# Patient Record
Sex: MALE | Race: WHITE
[De-identification: names, ages, dates, MRNs, and addresses within clinical notes are randomized per-mention and may not be internally consistent; named-entity substitution may affect disease eponyms.]

---

## 2019-01-29 ENCOUNTER — HOSPITAL ENCOUNTER (OUTPATIENT)
Dept: HOSPITAL 84 - D.LAB | Age: 74
Discharge: HOME | End: 2019-01-29
Attending: INTERNAL MEDICINE
Payer: MEDICARE

## 2019-01-29 VITALS — BODY MASS INDEX: 24.4 KG/M2

## 2019-01-29 DIAGNOSIS — K74.69: Primary | ICD-10-CM

## 2019-01-29 LAB
ALBUMIN SERPL-MCNC: 2.8 G/DL (ref 3.4–5)
ALP SERPL-CCNC: 61 U/L (ref 46–116)
ALT SERPL-CCNC: 65 U/L (ref 10–68)
ANION GAP SERPL CALC-SCNC: 10.5 MMOL/L (ref 8–16)
BASOPHILS NFR BLD AUTO: 0.2 % (ref 0–2)
BILIRUB SERPL-MCNC: 0.44 MG/DL (ref 0.2–1.3)
BUN SERPL-MCNC: 15 MG/DL (ref 7–18)
CALCIUM SERPL-MCNC: 8.6 MG/DL (ref 8.5–10.1)
CHLORIDE SERPL-SCNC: 103 MMOL/L (ref 98–107)
CO2 SERPL-SCNC: 31.5 MMOL/L (ref 21–32)
CREAT SERPL-MCNC: 0.9 MG/DL (ref 0.6–1.3)
EOSINOPHIL NFR BLD: 0.1 % (ref 0–7)
ERYTHROCYTE [DISTWIDTH] IN BLOOD BY AUTOMATED COUNT: 15.7 % (ref 11.5–14.5)
GLOBULIN SER-MCNC: 3.8 G/L
GLUCOSE SERPL-MCNC: 112 MG/DL (ref 74–106)
HCT VFR BLD CALC: 38.5 % (ref 42–54)
HGB BLD-MCNC: 12.3 G/DL (ref 13.5–17.5)
IMM GRANULOCYTES NFR BLD: 0.8 % (ref 0–5)
INR PPP: 1.04 (ref 0.85–1.17)
LYMPHOCYTES NFR BLD AUTO: 7.3 % (ref 15–50)
MCH RBC QN AUTO: 29.2 PG (ref 26–34)
MCHC RBC AUTO-ENTMCNC: 31.9 G/DL (ref 31–37)
MCV RBC: 91.4 FL (ref 80–100)
MONOCYTES NFR BLD: 5.4 % (ref 2–11)
NEUTROPHILS NFR BLD AUTO: 86.2 % (ref 40–80)
OSMOLALITY SERPL CALC.SUM OF ELEC: 280 MOSM/KG (ref 275–300)
PLATELET # BLD: 216 10X3/UL (ref 130–400)
PMV BLD AUTO: 9.1 FL (ref 7.4–10.4)
POTASSIUM SERPL-SCNC: 5 MMOL/L (ref 3.5–5.1)
PROT SERPL-MCNC: 6.6 G/DL (ref 6.4–8.2)
PROTHROMBIN TIME: 13.1 SECONDS (ref 11.6–15)
RBC # BLD AUTO: 4.21 10X6/UL (ref 4.2–6.1)
SODIUM SERPL-SCNC: 140 MMOL/L (ref 136–145)
WBC # BLD AUTO: 12.9 10X3/UL (ref 4.8–10.8)

## 2019-03-11 ENCOUNTER — HOSPITAL ENCOUNTER (OUTPATIENT)
Dept: HOSPITAL 84 - D.HCCARDIO | Age: 74
Discharge: HOME | End: 2019-03-11
Attending: INTERNAL MEDICINE
Payer: MEDICARE

## 2019-03-11 VITALS — BODY MASS INDEX: 24.4 KG/M2

## 2019-03-11 DIAGNOSIS — I25.10: Primary | ICD-10-CM

## 2019-03-15 NOTE — ST
PATIENT:ELI RUSSELL                            MEDICAL RECORD: [id number]
SEX: M                                                   LOCATION:[facility]         
ORDER #:                                                 ADMISSION DATE: 03/11/19
AGE OF PATIENT: 73            
 
REFERRING PHYSICIAN:                                                             
 
INTERPRETING PHYSICIAN: ROSA LOUIE MD             

 
 
DATE OF SERVICE:  03/11/2019
 
PROCEDURE:  Nuclear Stress Test.
 
INDICATION:  Angina, coronary artery disease, previous coronary artery bypass
grafting surgery.  He was exercised on standard Francis protocol for 7 minutes,
terminated due to achievement of maximum target heart rate response with 31 mCi
of sestamibi injected at peak stress.  10 mCi used previously for rest images.
 
FINDINGS:  Gated SPECT reveals a preserved ejection fraction of 55% with good
wall motioning and thickening and brightening throughout all segments.  SPECT
imaging Cardiolite was used as myocardial perfusion agent.  There is
reversibility anteroapically as well as inferiorly.  This includes the basal,
mid apical anterior segments as well as the apex itself includes the basal, mid,
apical inferior segments.  The degree of reversibility is moderate.  The amount
of myocardium involved is very large.
 
OVERALL IMPRESSION:  Markedly abnormal nuclear stress test, large areas of
reversibility anteroapically as well as inferiorly suggestive of multivessel
coronary artery disease.  We will proceed with coronary angiography as followup
study.
 
TRANSINT:[id number] Voice Confirmation ID: [id number] DOCUMENT ID: [id number]
 
 
                                           
                                           ROSA LOUIE MD             
 
 
 
Electronically Signed by ROSA LOUIE on 03/15/19 at 1457
 
 
 
 
 
 
 
 
CC:                                                             [id number]
DICTATION DATE: 03/11/19 1609     :     03/12/19 0511      DEP CLI 
                                                                      03/11/19
[place] [phone]

## 2019-03-29 ENCOUNTER — HOSPITAL ENCOUNTER (OUTPATIENT)
Dept: HOSPITAL 84 - D.CATH | Age: 74
End: 2019-03-29
Attending: INTERNAL MEDICINE
Payer: MEDICARE

## 2019-03-29 VITALS
HEIGHT: 71 IN | BODY MASS INDEX: 24.55 KG/M2 | BODY MASS INDEX: 24.55 KG/M2 | WEIGHT: 175.37 LBS | HEIGHT: 71 IN | WEIGHT: 175.37 LBS

## 2019-03-29 VITALS — SYSTOLIC BLOOD PRESSURE: 178 MMHG | DIASTOLIC BLOOD PRESSURE: 83 MMHG

## 2019-03-29 DIAGNOSIS — Z01.812: ICD-10-CM

## 2019-03-29 DIAGNOSIS — Z95.1: ICD-10-CM

## 2019-03-29 DIAGNOSIS — R94.30: ICD-10-CM

## 2019-03-29 DIAGNOSIS — I25.119: Primary | ICD-10-CM

## 2019-03-29 LAB
ANION GAP SERPL CALC-SCNC: 12.7 MMOL/L (ref 8–16)
BASOPHILS NFR BLD AUTO: 0 % (ref 0–2)
BUN SERPL-MCNC: 24 MG/DL (ref 7–18)
CALCIUM SERPL-MCNC: 8.7 MG/DL (ref 8.5–10.1)
CHLORIDE SERPL-SCNC: 103 MMOL/L (ref 98–107)
CO2 SERPL-SCNC: 27.8 MMOL/L (ref 21–32)
CREAT SERPL-MCNC: 0.9 MG/DL (ref 0.6–1.3)
EOSINOPHIL NFR BLD: 0.1 % (ref 0–7)
ERYTHROCYTE [DISTWIDTH] IN BLOOD BY AUTOMATED COUNT: 14.5 % (ref 11.5–14.5)
GLUCOSE SERPL-MCNC: 99 MG/DL (ref 74–106)
HCT VFR BLD CALC: 39.6 % (ref 42–54)
HGB BLD-MCNC: 13.3 G/DL (ref 13.5–17.5)
IMM GRANULOCYTES NFR BLD: 0.5 % (ref 0–5)
LYMPHOCYTES NFR BLD AUTO: 14.4 % (ref 15–50)
MCH RBC QN AUTO: 30 PG (ref 26–34)
MCHC RBC AUTO-ENTMCNC: 33.6 G/DL (ref 31–37)
MCV RBC: 89.4 FL (ref 80–100)
MONOCYTES NFR BLD: 6.6 % (ref 2–11)
NEUTROPHILS NFR BLD AUTO: 78.4 % (ref 40–80)
OSMOLALITY SERPL CALC.SUM OF ELEC: 281 MOSM/KG (ref 275–300)
PLATELET # BLD: 187 10X3/UL (ref 130–400)
PMV BLD AUTO: 9.2 FL (ref 7.4–10.4)
POTASSIUM SERPL-SCNC: 4.5 MMOL/L (ref 3.5–5.1)
RBC # BLD AUTO: 4.43 10X6/UL (ref 4.2–6.1)
SODIUM SERPL-SCNC: 139 MMOL/L (ref 136–145)
WBC # BLD AUTO: 8.1 10X3/UL (ref 4.8–10.8)

## 2019-03-29 NOTE — NUR
IV REMOVED. HEAD OF BED AT 90 DEGREES, RIGHT GROIN DRESSING IS CDI,
NO S/S OF BLEEDING OR HEMATOMA. EDUCATION REGARDING DISCHARGE
INSTRUCTIONS AND MEDICATION COMPLIANCE, PATIENT VOICES UNDERSTANDING.
VSS ON ROOM AIR. PHYSICIAN AT BEDSIDE TO UPDATE PATIENT AND FAMILY.

## 2019-03-29 NOTE — OP
PATIENT NAME:  ELI RUSSELL                            MEDICAL RECORD: [id number]
:[birth year]                                             LOCATION:D.CAT          
                                                         ADMISSION DATE:        
SURGEON:  ROSA LOUIE MD             
 
 
DATE OF OPERATION:  2019
 
DATE OF SERVICE:  2019
 
PROCEDURES:
1.  Left heart catheterization.
2.  Selective coronary angiography.
3.  Left ventriculogram.
4.  Vein graft angiography.
5.  LIMA angiography.
 
PROCEDURE IN DETAIL:  After informed consent was obtained and after a detailed
description of the risks, benefits as well as alternative therapies, the patient
elected to proceed with angiogram and heart catheterization.  The right femoral
area was prepped and draped in normal sterile fashion.  Right femoral artery was
cannulated via modified Seldinger technique with placement of 6-Emirati sheath. 
All catheters exchanged through this sheath.
 
FINDINGS:  The left ventriculogram was performed in standard 30-degree LANDRUM view,
reveals good cardiac wall motion throughout all segments.  Overall ejection
fraction estimated at 60%.
 
SELECTIVE CORONARY ANGIOGRAPHY:
1.  Left main is with no significant angiographic disease.
2.  Left anterior descending is totally occluded.
3.  Left circumflex is totally occluded.
4.  Right coronary is totally occluded.
5.  LIMA to the LAD is widely patent.  Distal LAD is widely patent.
6.  Vein graft to the circumflex OM is widely patent.  Distal circumflex OM is
small and diffusely diseased, but patent.
7.  The vein graft to the RCA is patent.  Distal RCA is small and diffusely
diseased, but patent.
 
OVERALL IMPRESSION:  Wide patency of 3 of 3 grafts with diffuse disease distally
that is amenable to medical management only.
 
TRANSINT:[id number] Voice Confirmation ID: [id number] DOCUMENT ID: [id number]
                                           
                                           ROSA LOUIE MD             
 
 
 
Electronically Signed by ROSA LOUIE on 19 at 1503
 
CC:                                                             [id number]
DICTATION DATE: 19 0901     :     19 1126      DEP CLI 
                                                                      19
[place] [id number] [place] [phone]

## 2019-03-29 NOTE — NUR
PATIENT RESTING, VSS ON 2L NC. WIFE PRESENT AT BEDSIDE. RIGHT GROIN
DRESSING IS CDI, NO S/S OF BLEEDING OR HEMATOMA. NO C/O PAIN,
NUMBNESS, OR TINGLING.

## 2019-03-29 NOTE — NUR
PATIENT TRANSPORTED VIA WHEELCHAIR TO BATHROOM, VOIDED WITHOUT
DIFFICULTY. PATIENT TRANSPORTED TO CAR WITH SPOUSE DRIVING, ALL
BELONGINGS SENT WITH PATIENT.

## 2019-03-29 NOTE — NUR
HEAD OF BED ELEVATED TO 80 DEGREES, RIGHT GROIN DRESSING IS CDI, NO
S/S OF BLEEDING OR HEMATOMA. PATIENT EATING FRUIT AND DRINKING
SPRITE, NO N/V. NO C/O PAIN, NUMBNESS, OR TINGLING.

## 2019-03-29 NOTE — NUR
HEAD OF BED ELEVATED TO 30 DEGREES, RIGHT GROIN DRESSING IS CDI, NO
S/S OF BLEEDING OR HEMATOMA. SPRITE GIVEN TO PATIENT PER REQUEST, NO
N/V.

## 2019-03-29 NOTE — HEMODYNAMI
PATIENT:ELI RUSSELL                                   MEDICAL RECORD: [id number]
: [birth year]                                            LOCATION:D.CAT          
ACCT# [id number]                                       ADMISSION DATE: 19
 
 
 Generatedon:3/29/53658:59
Patient name: ELI RUSSELL Patient #: [id number] Visit #: [id number] SSN: :
 1945
Date of study: 3/29/2019
Page: Of
Hemodynamic Procedure Report
****************************
Patient Data
Patient Demographics
Procedure consent was obtained
First Name: ELI          Gender: Male
Last Name: DREW            : 1945
Middle Initial: W           Age: 73 year(s)
Patient #: [id number]       Race: Unknown
Visit #: [id number]
Accession #:
[id number]
Additional ID: [id number]
Contact details
Address: [address] [place] [address]  Phone: [phone]
rd
State: [place]
City: [place]
Zip code: [address]
Past Medical History
Allergies: No known allergies
Admission
Admission Data
Admission Date: 3/29/2019   Admission Time: 7:18
Height (in.): 68            BSA: 1.93 (m2)
Height (cm.): 172.72        BMI: 26.46 (kg/m2)
Weight (lbs.): 174
Weight (kg.): 78.93
Procedure
Procedure Types
Cath Procedure
Diagnostic Procedure
LHC
LHC w/Coronaries w/Grafts
Procedure Description
Procedure Date
Procedure Date: 3/29/2019
Procedure Start Time: 8:47
Procedure End Time: 8:56
Procedure Staff
Name                            Function
Derek Matthews MD                Performing Physician
Paula Rick RN                Nurse
Dayna Davis RT               Monitor
Debo Cortez RT                    Scrub
Procedure Data
Cath Procedure
Fluoroscopy
Diagnostic fluoroscopy      Total fluoroscopy Time: 2.1
 
time: 2.1 min               min
Diagnostic fluoroscopy      Total fluoroscopy dose: 321
dose: 321 mGy               mGy
Contrast Material
Contrast Material Type                       Amount (ml)
Isovue 300                                   46
Entry Location
Entry     Primary  Successful  Side  Size  Upsize Upsize Entry    Closure Succes
sful  Closure
Location                             (Fr)  1 (Fr) 2 (Fr) Remarks  Device        
      Remarks
Femoral                        Right 5 Fr                         Exoseal
artery
Estimated blood loss: 10 ml
Diagnostic catheters
Device Type               Used For           End Catheter
Placement
MULTIPACK Pigtail 5 Fr    Procedure
catheter
MULTIPACK JL 4.0 5Fr      Procedure
catheter
MULTIPACK 3DRC 5Fr        Procedure
catheter
DIAGNOSTIC AR2 MOD 5 Fr   Procedure
catheter ([id number])
Procedure Complications
No complications
Procedure Medications
Medication           Administration Route Dosage
0.9% NaCl            I.V.                 100 ml/hr
Oxygen               etCO2 Nasal cannula  2 l/min
Lidocaine 2%         added to field       20
Heparin Flush Bag    added to field       2 bags
(1000units/500ml NS)
Versed               I.V.                 2 mg
Fentanyl             I.V.                 50 mcg
Versed               I.V.                 1 mg
Fentanyl             I.V.                 25 mcg
Hemodynamics
Rest
BSA: 1.93 (m2) O2 Consumption: Estimated: 204.91 (ml/min) O2 Consumption indexed
:
Estimated:106.17 (ml/min/m) Heart Rate: 46 (bpm)
Snapshots
Pre Cath      Intra         NCS           Post Cath
Vital Signs
Time    Heart  Resp   SPO2 etCO2   NIBP (mmHg) Rhythm  Pain    Sedation
Rate   (ipm)  (%)  (mmHg)                      Status  Level
(bpm)
8:33:30 44     16     98   35.3    183/89(104) SB      0 (11)  10(A)
, No
pain
8:37:59 45     17     100  34      182/91(164) SB      0 (11)  10(A)
, No
pain
8:42:27 49     18     97   34.6    172/90(147) SB      0 (11)  10(A)
, No
pain
8:47:38 45     18     99   14.2    165/83(145) SB      0 (11)  9(A)
, No
 
pain
8:52:56 44     19     98   16.5    176/79(152) SB      0 (11)  10(A)
, No
pain
Medications
Time    Medication       Route   Dose  Verified Delivered Reason     Notes  Effe
ctiveness
by       by
8:34:53 0.9% NaCl        I.V.    100   Derek  Paula     used for
ml/hr Cassie Rick   procedure
RN
8:35:00 Oxygen           etCO2   2     Derek  Paula     used for
Nasal   l/min Cassie Rick   procedure
cannula                RN
8:35:05 Lidocaine 2%     added   20ml  Derekamber Finleyrey   for local
to      vial  Cassie Matthews MD  anesthetic
field
8:35:10 Heparin Flush    added   2     Derek  Derek   used for
Bag              to      bags  Cassie Matthews MD  procedure
(1000units/500ml field
NS)
8:43:01 Versed           I.V.    2 mg  Derek  Paula     for
Cassie Rick   sedation
RN
8:43:19 Fentanyl         I.V.    50    Derek Mitchell     for
jay Rick   sedation
RN
8:47:58 Versed           I.V.    1 mg  Derek Oneilla     for
Cassie Rick   sedation
RN
8:48:02 Fentanyl         I.V.    25    Derek Mitchell     for
jay Rick   sedation
RN
Procedure Log
Time     Note
8:20:36  Diagnostic Cath Status : Elective
8:21:22  Debo Cortez RT(R) sent for patient. Start room use.
8:21:22  Time tracking: Regular hours (M-F 7:00 - 5:00)
8:21:27  Plan of Care:Hemodynamics will remain stable., Cardiac
rhythm will remain stable., Comfort level will be
maintained., Respiratory function will remain
adequate., Patient/ family verbilizes understanding of
procedure., Procedure tolerated without complication.,
Recovers from procedure without complications..
8:31:13  Patient received from Pre/Post Procedure Room to CCL 1
Alert and oriented. Tansferred to table in Supine
position.
8:31:14  Warm blankets applied, and roxanne hugger turned on for
patient comfort.
8:31:14  Correct patient and procedure confirmed by team.
8:31:15  Signed procedure consent form obtained from patient.
8:31:16  ECG and BP/O2 sat monitors applied to patient.
8:31:17  Vital chart was started
8:31:19  Full Disclosure recording started
8:31:23  H&P Date Dictated: 3/29/2019 Within 30 days and on
chart., H&P Addendum completed by physician on day of
procedure. (MUST COMPLETE FOR ALL OUTPATIENTS).
8:31:25  Pre-procedure instructions explained to patient.
8:31:26  Pre-op teaching completed and patient verbalized
understanding.
 
8:31:26  Family in waiting room.
8:31:29  Is the patient allergic to Iodine/contrast media? No.
8:31:30  Was the patient premedicated? No
8:31:32  Is patient on blood thinner?No
8:31:33  Patient diabetic? No.
8:31:35  Previous problem with sedation/anesthesia? No ?
8:31:37  Snore? No
8:31:38  Sleep apnea? No
8:31:39  Deviated septum? No
8:31:40  Opens mouth fully? Yes
8:31:41  Sticks out tongue? Yes
8:31:43  Airway obstruction? No ?
8:31:48  Dentures? No ?
8:31:52  Pre procedure: right dorsailis pedis pulse 2+ Normal;
easily identifiable; not easily obliterated
8:31:54  Pre procedure: left dorsailis pedis pulse 2+ Normal;
easily identifiable; not easily obliterated
8:31:59  IV patent on arrival in left forearm with 0.9% NaCl at
[facility].
8:32:01  Lab results completed and on chart.
8:32:05  Right groin area was prepped with chlora-prep and
draped in sterile fashion
8:32:06  Alarms reviewed by R. N.
8:32:07  Sharps counted by scrub and verified by R.N.
8:34:53  0.9% NaCl 100 ml/hr I.V. was administered by Paula Rick RN; used for procedure;
8:35:00  Oxygen 2 l/min etCO2 Nasal cannula was administered by
Paula Rick RN; used for procedure;
8:35:05  Lidocaine 2% 20ml vial added to field was administered
by Derek Matthews MD; for local anesthetic;
8:35:10  Heparin Flush Bag (1000units/500ml NS) 2 bags added to
field was administered by Derek Matthews MD; used for
procedure;
8:37:27  Patient allergic to No known allergies
8:39:07  Patient Height : 68 inches
8:39:09  Patient Weight : 174 lbs
8:41:28  Use device set Femoral Dx
8:41:29  ACIST Syringe (00352) opened to sterile field.
8:41:29  Bag Decanter (2002S) opened to sterile field.
8:41:30  ACIST Hand Control (79633) opened to sterile field.
8:41:30  ACIST Manifold (86650) opened to sterile field.
8:41:31  Tegaderm 4 x 4 (1626W) opened to sterile field.
8:41:34  Medline Cath Pack ([id number]) opened to sterile field.
8:41:35  DIAGNOSTIC WIRE .035 260cm J wire (147362) opened to
sterile field.
8:41:36  DIAGNOSTIC Multipack 5Fr catheter set ([id number]) opened
to sterile field.
8:41:37  SHEATH 5FR [place] ([id number]) opened to sterile field.
8:42:59  --------ALL STOP TIME OUT------
8:42:59  Final Timeout: patient, procedure, and site verified
with staff and physician. All members of the team are
in agreement.
8:43:01  Versed 2 mg I.V. was administered by Paula Rick RN;
for sedation;
8:43:01  Right groin site verified by team.
8:43:19  Fentanyl 50 mcg I.V. was administered by Paula Rick
RN; for sedation;
8:43:28  Maximum allowable Isovue 300 dose 300ml. Physician
notified. (300ml for normal creatinines. For patients
with creatinine of 1.7 or higher multiply weight(kg) x
 
5 divided by creatinine.)
8:43:32  Fire Safety Assessment: A--An alcohol-based skin
anteseptic being used preoperatively., C--Open oxygen
or nitrous oxide is being used., D--An ESU, laser, or
fiber-optic light is being used.
8:43:35  Physical assessment completed. ASA score P 2 - A
patient with mild systemic disease as per Derek Matthews MD.
8:43:39  Sedation plan: IV Moderate Sedation Medication:Versed,
Fentanyl
8:43:44  Zero performed for pressure channel P1
8:43:54  Zero performed for pressure channel P1
8:44:29  Baseline sample Acquired.
8:44:34  Rhythm: sinus bradycardia
8:47:12  Procedure started.
8:47:18  Local anesthetic to right femoral artery with
Lidocaine 2% by Derek Matthews MD.**INITIAL ACCESS
ONLY**
8:47:58  Versed 1 mg I.V. was administered by Paula Rick RN;
for sedation;
8:48:02  Fentanyl 25 mcg I.V. was administered by Paula Rick
RN; for sedation;
8:48:43  A 5 Fr sheath was inserted into the Right Femoral
artery
8:48:53  A MULTIPACK Pigtail 5 Fr catheter was advanced over
the wire and used for Procedure.
8:49:25  LV gram done using LANDRUM
8:49:30  Injector settings: Ml/sec: 10, Volume: 20,
8:49:35  EF : 70 %
8:49:36  Catheter removed.
8:49:40  A MULTIPACK JL 4.0 5Fr catheter was advanced over the
wire and used for Procedure.
8:50:27  LCA angiography performed.
8:50:29  Catheter removed.
8:50:36  A MULTIPACK 3DRC 5Fr catheter was advanced over the
wire and used for Procedure.
8:51:18  LIMA to LAD angiography performed.
8:51:41  RCA angiography performed.
8:51:49  Catheter removed.
8:51:54  A DIAGNOSTIC AR2 MOD 5 Fr catheter ([id number]) was
advanced over the wire and used for Procedure.
8:52:35  SVG to RCA angiography performed.
8:53:07  SVG to OM angiography performed.
8:53:08  Catheter removed.
8:53:57  EXOSEAL 5Fr () opened to sterile field.
8:54:11  Sheath removed intact; hemostasis achieved with
Exoseal to the Right Femoral artery.
8:54:20  Procedure ended.(Physican Out)
8:54:32  Fluoroscopy time 02.10 minutes.
8:54:48  Fluoroscopy dose: 321 mGy
8:54:48  Flurop Dose total: 321
8:54:54  Contrast amount:Isovue 300 46ml.
8:54:56  Sharps counted by scrub and verified by R.N.
8:55:04  Post-op/insertion site Right Femoral artery dressed
using a 4 x 4 and Tegaderm.
8:55:07  Post-procedure physical assessment completed. ASA
score P 2 - A patient with mild systemic disease as
per Derek Matthews MD.
8:55:12  Post procedure rhythm: sinus bradycardia
8:55:14  Estimated blood loss: 10 ml
 
8:55:16  Post procedure instruction explained to
patient.Patient verbalizes understanding.
8:55:16  Patient needs reinforcement of post procedure
teaching.
8:55:23  Procedure type changed to Cath procedure, Diagnostic
procedure, LHC, LHC w/Coronaries w/Grafts
8:55:55  Procedure and supply charges have been captured,
reviewed, submitted and are correct.
8:55:58  Procedure Complication : No complications
8:55:59  Vital chart was stopped
8:55:59  See physician's report for complete and final results.
8:56:02  Report given to Pre/Post Procedure Room.
8:56:04  Patient transfered to Pre/Post Procedure Room with
Bed.
8:56:06  Procedure ended.
8:56:06  Full Disclosure recording stopped
8:56:09  End room use (Document Last)
Device Usage
Item Name   Manufacture  Quantity  Catalog    Hospital Part    Current Minimal L
ot# /
Number     Charge   Number  Stock   Stock   Serial#
Code
ACEDY       Acist        1         31214      [id number]   [id number]  708648  20
Syringe     Medical
(57615)     Systems Inc
Bag         Microtek     1         S      391716   47364   369961  5
Decanter    Medical Inc.
(S)
ACIST Hand  Acist        1         38133      869458   454882  860989  5
Control     Medical
(54021)     Systems Inc
ACIST       Acist        1         84106      105417   264252  230013  5
Manifold    Medical
(07178)     Systems Inc
Tegaderm 4  3M           1         1626W      976315   501613  961701  5
x 4 (1626W)
Medline     Medline      1         [id number]  304764   33783   713961  5
Cath Pack
([id number])
DIAGNOSTIC  [employer] [place]      1         895165     409815   778388  208784  30
WIRE .035
260cm J
wire
(914724)
DIAGNOSTIC  Cardinal     1         RY2375     337093   98383   473653  30
Multipack   Health
5Fr
catheter
set
(QO4009)
SHEATH 5FR  Terumo       1         LRJ821     310388   560612  842665  5
[place]
(AOM696)
MULTIPACK   Cardinal     1                                     738230  5
Pigtail 5   Health
Fr catheter
MULTIPACK   Cardinal     1                                     110504  5
JL 4.0 5Fr  Health
catheter
MULTIPACK   Cardinal     1                                     506877  5
 
3DRC 5Fr    Health
catheter
DIAGNOSTIC  Cardinal     1         504330D    359294   812680  823353  20
AR2 MOD 5   Health
Fr catheter
(863523T)
EXOSEAL 5Fr Cardinal     1               397672   380745  613484  10
()     Health
Signature Audit [place]
Stage           Time        Signature      Unsigned
Intra-Procedure 3/29/2019   Dayna Davis
8:58:54 AM  RT(R)
Signatures
Monitor : Dayna Davis Signature :
RT                       ______________________________
Date : ______________ Time :
______________
 
 
 
 
 
 
 
 
 
 
 
 
 
 
 
 
 
 
 
 
 
 
 
 
 
 
 
 
 
 
 
 
 
 
 
 
 
 
[address] [place] [address]

## 2019-03-29 NOTE — NUR
PATIENT INTERMITTENTLY RESTING, WIFE AT BEDSIDE. VSS ON 2L NC. RIGHT
GROIN DRESSING IS CDI, NO S/S OF BLEEDING OR HEMATOMA. NO C/O
PAIN, NUMBNESS, OR TINGLING.